# Patient Record
Sex: MALE | Race: BLACK OR AFRICAN AMERICAN | Employment: STUDENT | URBAN - METROPOLITAN AREA
[De-identification: names, ages, dates, MRNs, and addresses within clinical notes are randomized per-mention and may not be internally consistent; named-entity substitution may affect disease eponyms.]

---
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
## 2023-05-31 ENCOUNTER — OFFICE VISIT (OUTPATIENT)
Dept: URGENT CARE | Facility: CLINIC | Age: 18
End: 2023-05-31
Payer: COMMERCIAL

## 2023-05-31 VITALS
RESPIRATION RATE: 18 BRPM | HEIGHT: 73 IN | HEART RATE: 67 BPM | DIASTOLIC BLOOD PRESSURE: 66 MMHG | OXYGEN SATURATION: 97 % | SYSTOLIC BLOOD PRESSURE: 122 MMHG | TEMPERATURE: 98 F | WEIGHT: 189 LBS | BODY MASS INDEX: 25.05 KG/M2

## 2023-05-31 DIAGNOSIS — J02.0 STREP PHARYNGITIS: Primary | ICD-10-CM

## 2023-05-31 DIAGNOSIS — J02.9 SORE THROAT: ICD-10-CM

## 2023-05-31 DIAGNOSIS — Z20.818 STREP THROAT EXPOSURE: ICD-10-CM

## 2023-05-31 LAB
CTP QC/QA: YES
MOLECULAR STREP A: POSITIVE

## 2023-05-31 PROCEDURE — 99203 OFFICE O/P NEW LOW 30 MIN: CPT | Mod: S$GLB,,, | Performed by: PHYSICIAN ASSISTANT

## 2023-05-31 PROCEDURE — 87651 STREP A DNA AMP PROBE: CPT | Mod: QW,S$GLB,, | Performed by: PHYSICIAN ASSISTANT

## 2023-05-31 PROCEDURE — 87651 POCT STREP A MOLECULAR: ICD-10-PCS | Mod: QW,S$GLB,, | Performed by: PHYSICIAN ASSISTANT

## 2023-05-31 PROCEDURE — 99203 PR OFFICE/OUTPT VISIT, NEW, LEVL III, 30-44 MIN: ICD-10-PCS | Mod: S$GLB,,, | Performed by: PHYSICIAN ASSISTANT

## 2023-05-31 RX ORDER — AMOXICILLIN 500 MG/1
500 CAPSULE ORAL EVERY 12 HOURS
Qty: 20 CAPSULE | Refills: 0 | Status: SHIPPED | OUTPATIENT
Start: 2023-05-31 | End: 2023-06-10

## 2023-05-31 NOTE — PROGRESS NOTES
"Subjective:      Patient ID: Julian Benson III is a 18 y.o. male.    Vitals:  height is 6' 1" (1.854 m) and weight is 85.7 kg (189 lb). His oral temperature is 98.1 °F (36.7 °C). His blood pressure is 122/66 and his pulse is 67. His respiration is 18 and oxygen saturation is 97%.     Chief Complaint: Sore Throat (Exposure to strep)    Pt presents to the clinic today with a sore throat that began this morning. Pt states that he has strep throat concerns because girl friend tested positive for strep 2 days ago. Denies fever or rash. Took aspirin this morning.     Sore Throat   This is a new problem. The current episode started today. The problem has been unchanged. Neither side of throat is experiencing more pain than the other. There has been no fever. The fever has been present for Less than 1 day. The pain is at a severity of 4/10. The pain is mild. Pertinent negatives include no abdominal pain, congestion, coughing, diarrhea, drooling, ear discharge, ear pain, headaches, hoarse voice, plugged ear sensation, neck pain, shortness of breath, stridor, swollen glands, trouble swallowing or vomiting. He has had exposure to strep. He has had no exposure to mono. He has tried nothing for the symptoms.     Constitution: Negative.   HENT:  Positive for sore throat. Negative for ear pain, ear discharge, drooling, congestion, trouble swallowing and voice change.    Neck: Negative for neck pain and neck stiffness.   Respiratory:  Negative for cough, shortness of breath and stridor.    Gastrointestinal:  Negative for abdominal pain, vomiting and diarrhea.   Skin: Negative.    Neurological:  Negative for headaches.    Objective:     Physical Exam   Constitutional: He appears well-developed.  Non-toxic appearance. He does not appear ill. No distress.   HENT:   Head: Normocephalic and atraumatic.   Ears:   Right Ear: External ear normal.   Left Ear: External ear normal.   Nose: Nose normal.   Mouth/Throat: Uvula is midline. " Mucous membranes are moist. Posterior oropharyngeal erythema present. No oropharyngeal exudate or tonsillar abscesses. Tonsils are 1+ on the right. Tonsils are 1+ on the left.   Eyes: Conjunctivae and EOM are normal.   Neck: Neck supple.   Pulmonary/Chest: Effort normal and breath sounds normal.   Abdominal: Normal appearance.   Musculoskeletal: Normal range of motion.         General: Normal range of motion.   Lymphadenopathy:     He has no cervical adenopathy.   Neurological: no focal deficit. He is alert. He displays no weakness. Gait normal.   Skin: Skin is warm, dry, not diaphoretic, not pale and no rash.   Psychiatric: His behavior is normal.   Results for orders placed or performed in visit on 05/31/23   POCT Strep A, Molecular   Result Value Ref Range    Molecular Strep A, POC Positive (A) Negative     Acceptable Yes        Assessment:     1. Strep pharyngitis    2. Sore throat    3. Strep throat exposure        Plan:       Strep pharyngitis  -     amoxicillin (AMOXIL) 500 MG capsule; Take 1 capsule (500 mg total) by mouth every 12 (twelve) hours. for 10 days  Dispense: 20 capsule; Refill: 0    Sore throat  -     POCT Strep A, Molecular    Strep throat exposure  -     POCT Strep A, Molecular

## 2023-05-31 NOTE — LETTER
May 31, 2023      [facility]  [address]  [place] [address]  Phone: [phone]       Patient: Julian Benson III   YOB: 2005  Date of Visit: 05/31/2023    To Whom It May Concern:    Chely Benson III  was at [facility] on 05/31/2023. The patient may return to work/school on 6/2/23 with no restrictions. If you have any questions or concerns, or if I can be of further assistance, please do not hesitate to contact me.    Sincerely,    May Perez PA-C

## 2023-06-03 ENCOUNTER — TELEPHONE (OUTPATIENT)
Dept: URGENT CARE | Facility: CLINIC | Age: 18
End: 2023-06-03
Payer: COMMERCIAL

## 2023-06-03 NOTE — TELEPHONE ENCOUNTER
Courtesy call was made. Patient states that he is feeling much better. Did not have any questions or concerns for me.

## 2024-11-02 ENCOUNTER — ATHLETIC TRAINING SESSION (OUTPATIENT)
Dept: SPORTS MEDICINE | Facility: CLINIC | Age: 19
End: 2024-11-02
Payer: COMMERCIAL

## 2024-11-02 DIAGNOSIS — S13.4XXA WHIPLASH INJURIES, INITIAL ENCOUNTER: Primary | ICD-10-CM

## 2024-11-07 ENCOUNTER — ATHLETIC TRAINING SESSION (OUTPATIENT)
Dept: SPORTS MEDICINE | Facility: CLINIC | Age: 19
End: 2024-11-07
Payer: COMMERCIAL

## 2024-11-07 DIAGNOSIS — S13.4XXD WHIPLASH INJURY TO NECK, SUBSEQUENT ENCOUNTER: Primary | ICD-10-CM

## 2024-11-07 NOTE — PROGRESS NOTES
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 who had concerns including Injury of the Neck.    Athlete is doing better and the soreness is easing up and their ROM is getting better. There was never any new or exacerbation of symptoms/headache.    11/3/24 - Examination  Spurling (-)  Foraminal compression (-)  Foraminal distraction (-)  "Feels like they slept wrong in their neck"      Sport played: basketball      Level: college            Injury  The problem has been gradually improving.     Assessment:     Status: F - Full Participation    Date Seen:  11/3/24    Date of Injury:  11/1/24    Treatment/Rehab/Maintenance:     Julian completed therapeutic exercises to develop ROM:    Subjective:   11/3/24 - R sided p! W/ lateral flexion  Exercise Reps/Sets/Time Weight #   MHP 10min    Active release/effleurage  8min    PROM 5min    Repeated ROM  Lateral flexion, flexion, extension, rotation 3x12                                                     Subjective:   11/4/24   Exercise Reps/Sets/Time Weight #   MHP 10min    Active release/effleurage  8min    PROM 5min    Repeated ROM  Lateral flexion, flexion, extension, rotation 3x12                                                   Subjective:   11/6/24   Exercise Reps/Sets/Time Weight #   MHP 10min    Active release/effleurage  8min    PROM 5min    Repeated ROM  Lateral flexion, flexion, extension, rotation 3x12                                                     Julian received the following  modalities and/or manual therapy techniques:    11/5/24  Modality/Manual Therapy Time   E-stim IFC-    Normatec-    Ice bath -  12min   IASTM-    Cupping-    Massage-    Joint Mobilizations-    Flossing-        11/7/24  Modality/Manual Therapy Time   E-stim IFC-    Normatec-    US-    IASTM-    Cupping-    Massage- w/atomic balm 10min   Joint Mobilizations-    Flossing- 8min       Plan: "       1. Athlete will come in for rehab and treatment as needed. Will be full go for game 11/9/24.  2. Physician Referral: no  3. ED Referral:no  4. Parent/Guardian Notified: No  5. All questions were answered, ath. will contact me for questions or concerns in  the interim.  6.         Eligible to use School Insurance: Yes